# Patient Record
Sex: MALE | Race: BLACK OR AFRICAN AMERICAN | NOT HISPANIC OR LATINO | URBAN - METROPOLITAN AREA
[De-identification: names, ages, dates, MRNs, and addresses within clinical notes are randomized per-mention and may not be internally consistent; named-entity substitution may affect disease eponyms.]

---

## 2022-08-09 ENCOUNTER — OFFICE (OUTPATIENT)
Dept: URBAN - METROPOLITAN AREA CLINIC 12 | Facility: CLINIC | Age: 37
End: 2022-08-09

## 2022-08-09 VITALS
DIASTOLIC BLOOD PRESSURE: 82 MMHG | OXYGEN SATURATION: 98 % | WEIGHT: 148 LBS | SYSTOLIC BLOOD PRESSURE: 130 MMHG | HEART RATE: 59 BPM | HEIGHT: 64 IN

## 2022-08-09 DIAGNOSIS — K62.5 HEMORRHAGE OF ANUS AND RECTUM: ICD-10-CM

## 2022-08-09 DIAGNOSIS — K64.9 UNSPECIFIED HEMORRHOIDS: ICD-10-CM

## 2022-08-09 DIAGNOSIS — Z83.71 FAMILY HISTORY OF COLONIC POLYPS: ICD-10-CM

## 2022-08-09 PROCEDURE — 99204 OFFICE O/P NEW MOD 45 MIN: CPT | Performed by: INTERNAL MEDICINE

## 2022-08-09 RX ORDER — HYDROCORTISONE 25 MG/G
CREAM TOPICAL
Qty: 20 | Refills: 1 | Status: ACTIVE
Start: 2022-08-09

## 2022-08-09 NOTE — SERVICENOTES
Though this could very well be related to hemorrhoids or fissure, given his age, family history, etc I do think it is reasonable that he undergo colonoscopy to rule out neoplasm, colitis, etc.  In the meantime we will give a trial of hydrocortisone cream and I did recommend that he not sit on the toilet for a long time but rather get on and then off quickly.  Also recommended Sitz baths.  If symptoms persist and there are no significant hemorrhoid seen on exam then we can consider empiric trial of fissure medication.

## 2022-08-09 NOTE — SERVICEHPINOTES
Mr. Asencio is a 37-year-old man here for evaluation of intermittent rectal bleeding. The patient states that over the past year he has had some rectal bleeding off and on. It usually will be scant amount of blood with wiping or on the stool but sometimes he will notice stripping especially if he sits down to urinate. This would only last for a few days and then will be fine for a while before returning, however over the past three weeks he has noted more persistent issues on a daily basis. He denies any large blood clots or kathleen hematochezia by itself outside of the small trips that occur during urination while sitting. He states that he normally has 1-2 bowel movements a day which are normally soft with does admit to straining on a regular basis. He also admits to sitting on the toilet for a long time. He denies any erectile stinging or burning on a regular basis. He denies any abdominal pain. He will really take ibuprofen. He does have a family history of colon polyps in his mother but denies any first-degree family history of colon cancer.

## 2022-09-08 ENCOUNTER — OFFICE (OUTPATIENT)
Dept: URBAN - METROPOLITAN AREA CLINIC 11 | Facility: CLINIC | Age: 37
End: 2022-09-08